# Patient Record
Sex: FEMALE | Race: BLACK OR AFRICAN AMERICAN | NOT HISPANIC OR LATINO
[De-identification: names, ages, dates, MRNs, and addresses within clinical notes are randomized per-mention and may not be internally consistent; named-entity substitution may affect disease eponyms.]

---

## 2017-08-07 PROBLEM — Z00.00 ENCOUNTER FOR PREVENTIVE HEALTH EXAMINATION: Status: ACTIVE | Noted: 2017-08-07

## 2019-03-22 ENCOUNTER — RESULT REVIEW (OUTPATIENT)
Age: 74
End: 2019-03-22

## 2020-02-24 ENCOUNTER — LABORATORY RESULT (OUTPATIENT)
Age: 75
End: 2020-02-24

## 2020-02-24 ENCOUNTER — APPOINTMENT (OUTPATIENT)
Dept: UROLOGY | Facility: CLINIC | Age: 75
End: 2020-02-24
Payer: MEDICAID

## 2020-02-24 DIAGNOSIS — Z78.9 OTHER SPECIFIED HEALTH STATUS: ICD-10-CM

## 2020-02-24 DIAGNOSIS — E11.9 TYPE 2 DIABETES MELLITUS W/OUT COMPLICATIONS: ICD-10-CM

## 2020-02-24 PROCEDURE — 99204 OFFICE O/P NEW MOD 45 MIN: CPT

## 2020-02-24 RX ORDER — ASPIRIN 81 MG
81 TABLET, DELAYED RELEASE (ENTERIC COATED) ORAL
Refills: 0 | Status: ACTIVE | COMMUNITY

## 2020-02-24 RX ORDER — MELOXICAM 15 MG/1
TABLET ORAL
Refills: 0 | Status: ACTIVE | COMMUNITY

## 2020-02-24 RX ORDER — METFORMIN HYDROCHLORIDE 625 MG/1
TABLET ORAL
Refills: 0 | Status: ACTIVE | COMMUNITY

## 2020-02-24 RX ORDER — PRAVASTATIN SODIUM 80 MG/1
TABLET ORAL
Refills: 0 | Status: ACTIVE | COMMUNITY

## 2020-02-24 RX ORDER — LOSARTAN POTASSIUM 100 MG/1
TABLET, FILM COATED ORAL
Refills: 0 | Status: ACTIVE | COMMUNITY

## 2020-02-24 NOTE — LETTER BODY
[Dear  ___] : Dear  [unfilled], [Consult Letter:] : I had the pleasure of evaluating your patient, [unfilled]. [Please see my note below.] : Please see my note below. [Consult Closing:] : Thank you very much for allowing me to participate in the care of this patient.  If you have any questions, please do not hesitate to contact me. [FreeTextEntry2] : Trey Maldonado MD\par [address]\[place] [address]\par  [Sincerely,] : Sincerely, [FreeTextEntry3] : Humberto Beasley MD\par Director of Robotic and Minimally Invasive Urologic Surgery\par [facility]\par

## 2020-02-24 NOTE — LETTER HEADER
[FreeTextEntry3] : Humberto Beasley MD\par Director of Robotic and Minimally Invasive Urologic Surgery\par \par [facility]\par Division of Urology\par [address]\[place] [address]\par Tel [phone]\par Fax [phone]\par \par

## 2020-02-24 NOTE — PHYSICAL EXAM
[General Appearance - Well Developed] : well developed [General Appearance - Well Nourished] : well nourished [Normal Appearance] : normal appearance [Well Groomed] : well groomed [General Appearance - In No Acute Distress] : no acute distress [Edema] : no peripheral edema [Respiration, Rhythm And Depth] : normal respiratory rhythm and effort [Exaggerated Use Of Accessory Muscles For Inspiration] : no accessory muscle use [Abdomen Soft] : soft [Abdomen Tenderness] : non-tender [Costovertebral Angle Tenderness] : no ~M costovertebral angle tenderness [Urinary Bladder Findings] : the bladder was normal on palpation [FreeTextEntry1] : slow gait [] : no rash [Oriented To Time, Place, And Person] : oriented to person, place, and time [Affect] : the affect was normal [Mood] : the mood was normal [Not Anxious] : not anxious [No Palpable Adenopathy] : no palpable adenopathy

## 2020-02-24 NOTE — ASSESSMENT
[FreeTextEntry1] : GEOVANNI LERNER is a 74 year old female who presents with a right staghorn calculus.\par Recommend CT a/p for further anatomical evaluation in preparation for likely PCNL.\par Pt wishes to have knee replacement surgery therefore ideally we would preform this prior.

## 2020-02-24 NOTE — HISTORY OF PRESENT ILLNESS
[FreeTextEntry1] : GEOVANNI LERNER is a 74 year old female who presents with a right staghorn calculus. \par Pt denies any flank pain.\par Had KUB during orthopedic w/u which showed right staghorn, complete.\par Ultrasound images show multiple ~ 2 cm right renal stones no hydro, images visualized.\par Denies LUTS.\par Denies gross hematuria, dysuria or associated symptoms. Denies pelvic pain.\par \par Denies  PMH including previous kidney stones, recurrent UTIs. \par Family History: No  malignancies\par \par \par

## 2020-02-25 LAB
APPEARANCE: ABNORMAL
BILIRUBIN URINE: NEGATIVE
BLOOD URINE: ABNORMAL
COLOR: YELLOW
GLUCOSE QUALITATIVE U: NEGATIVE
KETONES URINE: NEGATIVE
LEUKOCYTE ESTERASE URINE: ABNORMAL
NITRITE URINE: NEGATIVE
PH URINE: 7.5
PROTEIN URINE: ABNORMAL
SPECIFIC GRAVITY URINE: 1.02
UROBILINOGEN URINE: NORMAL

## 2020-03-01 ENCOUNTER — FORM ENCOUNTER (OUTPATIENT)
Age: 75
End: 2020-03-01

## 2020-03-02 ENCOUNTER — OUTPATIENT (OUTPATIENT)
Dept: OUTPATIENT SERVICES | Facility: HOSPITAL | Age: 75
LOS: 1 days | Discharge: HOME | End: 2020-03-02
Payer: MEDICARE

## 2020-03-02 DIAGNOSIS — N20.0 CALCULUS OF KIDNEY: ICD-10-CM

## 2020-03-02 PROCEDURE — 74176 CT ABD & PELVIS W/O CONTRAST: CPT | Mod: 26

## 2020-03-04 ENCOUNTER — RESULT REVIEW (OUTPATIENT)
Age: 75
End: 2020-03-04

## 2020-03-12 ENCOUNTER — APPOINTMENT (OUTPATIENT)
Dept: UROLOGY | Facility: CLINIC | Age: 75
End: 2020-03-12
Payer: MEDICARE

## 2020-03-12 PROCEDURE — 99214 OFFICE O/P EST MOD 30 MIN: CPT

## 2020-03-12 NOTE — HISTORY OF PRESENT ILLNESS
[FreeTextEntry1] : GEOVANNI LERNER is a 74 year old female who presents with a right staghorn calculus. \par Pt denies any flank pain.\par CT a/p images visualized Shows a 7.8 cm right massive staghorn calculus. Small right kidney as compared to the left with mild hydronephrosis. Still has decent parenchyma.\par Nonspecific mildly enlarged retroperitoneal lymph nodes 1 cm\par UA neg for microheme 3/2020 +wbcs\par \par Prev Had KUB during orthopedic w/u which showed right staghorn, complete.\par Ultrasound images show multiple ~ 2 cm right renal stones no hydro, images visualized.\par Denies LUTS.\par Denies gross hematuria, dysuria or associated symptoms. Denies pelvic pain.\par \par Denies  PMH including previous kidney stones, recurrent UTIs. \par Family History: No  malignancies\par \par \par

## 2020-03-12 NOTE — LETTER BODY
[Dear  ___] : Dear  [unfilled], [Consult Letter:] : I had the pleasure of evaluating your patient, [unfilled]. [Please see my note below.] : Please see my note below. [Consult Closing:] : Thank you very much for allowing me to participate in the care of this patient.  If you have any questions, please do not hesitate to contact me. [Sincerely,] : Sincerely, [FreeTextEntry2] : Trey Maldonado MD\par [address]\[place] [address]\par  [FreeTextEntry3] : Humberto Beasley MD\par Director of Robotic and Minimally Invasive Urologic Surgery\par [facility]\par

## 2020-03-12 NOTE — ASSESSMENT
[FreeTextEntry1] : GEOVANNI LERNER is a 74 year old female who presents with a right staghorn calculus w smaller kidney as compared to contralateral side.\par Plan for renal scan assess function. If adequate function will perform PCNL.\par Pt aware of RP lymphadenopathy non specific, may be related to inflammation from stone however she needs fu imaging which she will obtain w PCP.\par Pt wishes to have knee replacement surgery therefore ideally we would preform this prior.

## 2020-03-12 NOTE — PHYSICAL EXAM
[General Appearance - Well Developed] : well developed [General Appearance - Well Nourished] : well nourished [Normal Appearance] : normal appearance [Well Groomed] : well groomed [General Appearance - In No Acute Distress] : no acute distress [Abdomen Soft] : soft [Abdomen Tenderness] : non-tender [Costovertebral Angle Tenderness] : no ~M costovertebral angle tenderness [Urinary Bladder Findings] : the bladder was normal on palpation [Edema] : no peripheral edema [] : no respiratory distress [Respiration, Rhythm And Depth] : normal respiratory rhythm and effort [Exaggerated Use Of Accessory Muscles For Inspiration] : no accessory muscle use [Oriented To Time, Place, And Person] : oriented to person, place, and time [Affect] : the affect was normal [Mood] : the mood was normal [Not Anxious] : not anxious [FreeTextEntry1] : slow gait [No Palpable Adenopathy] : no palpable adenopathy

## 2020-03-12 NOTE — LETTER HEADER
[FreeTextEntry3] : Humberto Beasley MD\par Director of Robotic and Minimally Invasive Urologic Surgery\par \par [facility]\par Division of Urology\par [address]\[place] [address]\par Tel [phone]\par Fax [phone]\par \par

## 2020-03-30 ENCOUNTER — APPOINTMENT (OUTPATIENT)
Dept: UROLOGY | Facility: CLINIC | Age: 75
End: 2020-03-30

## 2020-06-10 ENCOUNTER — RESULT REVIEW (OUTPATIENT)
Age: 75
End: 2020-06-10

## 2020-06-10 ENCOUNTER — OUTPATIENT (OUTPATIENT)
Dept: OUTPATIENT SERVICES | Facility: HOSPITAL | Age: 75
LOS: 1 days | Discharge: HOME | End: 2020-06-10
Payer: MEDICARE

## 2020-06-10 DIAGNOSIS — N20.0 CALCULUS OF KIDNEY: ICD-10-CM

## 2020-06-10 PROCEDURE — 78707 K FLOW/FUNCT IMAGE W/O DRUG: CPT | Mod: 26

## 2020-06-16 ENCOUNTER — APPOINTMENT (OUTPATIENT)
Dept: UROLOGY | Facility: CLINIC | Age: 75
End: 2020-06-16
Payer: MEDICARE

## 2020-06-16 VITALS
SYSTOLIC BLOOD PRESSURE: 120 MMHG | HEART RATE: 70 BPM | DIASTOLIC BLOOD PRESSURE: 80 MMHG | TEMPERATURE: 98.1 F | WEIGHT: 210 LBS | BODY MASS INDEX: 31.83 KG/M2 | HEIGHT: 68 IN

## 2020-06-16 DIAGNOSIS — R59.0 LOCALIZED ENLARGED LYMPH NODES: ICD-10-CM

## 2020-06-16 DIAGNOSIS — N13.30 UNSPECIFIED HYDRONEPHROSIS: ICD-10-CM

## 2020-06-16 DIAGNOSIS — N20.0 CALCULUS OF KIDNEY: ICD-10-CM

## 2020-06-16 DIAGNOSIS — N26.1 ATROPHY OF KIDNEY (TERMINAL): ICD-10-CM

## 2020-06-16 PROCEDURE — 99215 OFFICE O/P EST HI 40 MIN: CPT

## 2020-06-16 NOTE — ASSESSMENT
[FreeTextEntry1] : GEOVANNI LERNER is a 74 year old female who presents with a right staghorn calculus w smaller kidney as compared to contralateral side, 9 % function on renal scan.\par \par We discussed observation versus nephrectomy and the patient elects to undergo minimally invasive nephrectomy given concern for future sepsis and seeding of her hardware.\par CT a/p with contrast prior, will reassess lymphadenopathy and vasculature\par \par We discussed risk of any major surgery, including but not limited to MI, CVA, DVT, infection, blood transfusion, wound healing problems, infection, injury to surrounding structures (i.e. including but not limited to bowel or other adjacent organs), positioning injuries.  All of these issues were reviewed. We also discussed risk of renal insufficiency and dialysis short and long-term with R Nx. \par We also discussed open vs. MIS and advantages and disadvantages each way. For minimally invasive surgery, we discussed possibility that conversion to open surgery might be required dependent on intraoperative findings and anatomy.\par

## 2020-06-16 NOTE — LETTER BODY
[Dear  ___] : Dear  [unfilled], [Consult Letter:] : I had the pleasure of evaluating your patient, [unfilled]. [Consult Closing:] : Thank you very much for allowing me to participate in the care of this patient.  If you have any questions, please do not hesitate to contact me. [Please see my note below.] : Please see my note below. [Sincerely,] : Sincerely, [FreeTextEntry2] : Trey Maldonado MD\par [address]\[place] [address]\par  [FreeTextEntry3] : Humberto Beasley MD\par Director of Robotic and Minimally Invasive Urologic Surgery\par [facility]\par

## 2020-06-16 NOTE — PHYSICAL EXAM
[General Appearance - Well Developed] : well developed [General Appearance - Well Nourished] : well nourished [Normal Appearance] : normal appearance [Well Groomed] : well groomed [General Appearance - In No Acute Distress] : no acute distress [Abdomen Soft] : soft [Abdomen Tenderness] : non-tender [Costovertebral Angle Tenderness] : no ~M costovertebral angle tenderness [Urinary Bladder Findings] : the bladder was normal on palpation [Edema] : no peripheral edema [] : no respiratory distress [Exaggerated Use Of Accessory Muscles For Inspiration] : no accessory muscle use [Respiration, Rhythm And Depth] : normal respiratory rhythm and effort [Oriented To Time, Place, And Person] : oriented to person, place, and time [Mood] : the mood was normal [Affect] : the affect was normal [Not Anxious] : not anxious [No Palpable Adenopathy] : no palpable adenopathy [FreeTextEntry1] : slow gait

## 2020-06-16 NOTE — LETTER HEADER
[FreeTextEntry3] : Humberto Beasley MD\par Director of Robotic and Minimally Invasive Urologic Surgery\par \par [facility]\par Division of Urology\par [address]\[place] [address]\par Tel [phone]\par Fax [phone]\par \par

## 2020-06-16 NOTE — HISTORY OF PRESENT ILLNESS
[FreeTextEntry1] : GEOVANNI LERNER is a 74 year old female who presents with a right staghorn calculus. \par \par Pt denies any flank pain in general though though she states she did have right flank pain on day of her renal scan.\par Renal scan images reviewed show 9% function on right kidney, 91% left. \par \par Creatinine 0.6 5/2020\par GFR 90\par A1c 6.6\par UA neg fro microheme 2/2020\par \par CT a/p images visualized Shows a 7.8 cm right massive staghorn calculus. Small right kidney as compared to the left with mild hydronephrosis. Still has decent parenchyma.\par Nonspecific mildly enlarged retroperitoneal lymph nodes 1 cm\par UA neg for microheme 3/2020 +wbcs\par \par Previously Had KUB during orthopedic w/u which showed right staghorn, complete.\par Ultrasound images show multiple ~ 2 cm right renal stones no hydro, images visualized.\par Denies LUTS.\par Denies gross hematuria, dysuria or associated symptoms. Denies pelvic pain.\par \par Denies  PMH including previous kidney stones, recurrent UTIs. \par PSH none\par Family History: No  malignancies\par \par \par

## 2020-06-25 ENCOUNTER — APPOINTMENT (OUTPATIENT)
Dept: UROLOGY | Facility: CLINIC | Age: 75
End: 2020-06-25

## 2020-07-22 ENCOUNTER — APPOINTMENT (OUTPATIENT)
Dept: UROLOGY | Facility: HOSPITAL | Age: 75
End: 2020-07-22